# Patient Record
Sex: FEMALE | Race: WHITE | Employment: OTHER | URBAN - METROPOLITAN AREA
[De-identification: names, ages, dates, MRNs, and addresses within clinical notes are randomized per-mention and may not be internally consistent; named-entity substitution may affect disease eponyms.]

---

## 2017-11-29 PROCEDURE — 82550 ASSAY OF CK (CPK): CPT | Performed by: INTERNAL MEDICINE

## 2017-11-29 PROCEDURE — 84439 ASSAY OF FREE THYROXINE: CPT | Performed by: INTERNAL MEDICINE

## 2017-11-29 PROCEDURE — 82306 VITAMIN D 25 HYDROXY: CPT | Performed by: INTERNAL MEDICINE

## 2017-11-29 PROCEDURE — 36415 COLL VENOUS BLD VENIPUNCTURE: CPT | Performed by: INTERNAL MEDICINE

## 2017-11-29 PROCEDURE — 80061 LIPID PANEL: CPT | Performed by: INTERNAL MEDICINE

## 2017-11-29 PROCEDURE — 80050 GENERAL HEALTH PANEL: CPT | Performed by: INTERNAL MEDICINE

## 2017-11-29 PROCEDURE — 81001 URINALYSIS AUTO W/SCOPE: CPT | Performed by: INTERNAL MEDICINE

## 2017-11-29 PROCEDURE — 83036 HEMOGLOBIN GLYCOSYLATED A1C: CPT | Performed by: INTERNAL MEDICINE

## 2018-06-22 PROBLEM — G47.33 OSA (OBSTRUCTIVE SLEEP APNEA): Status: ACTIVE | Noted: 2018-06-22

## 2019-03-31 PROBLEM — H01.001 BLEPHARITIS OF UPPER EYELIDS OF BOTH EYES: Status: ACTIVE | Noted: 2018-11-20

## 2019-03-31 PROBLEM — H25.13 NUCLEAR SCLEROSIS, BILATERAL: Status: ACTIVE | Noted: 2018-11-20

## 2019-03-31 PROBLEM — H01.004 BLEPHARITIS OF UPPER EYELIDS OF BOTH EYES: Status: ACTIVE | Noted: 2018-11-20

## 2019-03-31 PROBLEM — H43.813 DEGENERATION OF POSTERIOR VITREOUS BODY OF BOTH EYES: Status: ACTIVE | Noted: 2018-11-20

## 2019-03-31 PROBLEM — H52.4 PRESBYOPIA: Status: ACTIVE | Noted: 2018-11-20

## 2019-06-12 ENCOUNTER — APPOINTMENT (OUTPATIENT)
Dept: GENERAL RADIOLOGY | Age: 64
End: 2019-06-12
Attending: FAMILY MEDICINE
Payer: MEDICARE

## 2019-06-12 ENCOUNTER — HOSPITAL ENCOUNTER (OUTPATIENT)
Age: 64
Discharge: HOME | End: 2019-06-12
Attending: FAMILY MEDICINE
Payer: MEDICARE

## 2019-06-12 VITALS
RESPIRATION RATE: 18 BRPM | HEART RATE: 72 BPM | DIASTOLIC BLOOD PRESSURE: 56 MMHG | WEIGHT: 170 LBS | HEIGHT: 63 IN | BODY MASS INDEX: 30.12 KG/M2 | SYSTOLIC BLOOD PRESSURE: 109 MMHG | OXYGEN SATURATION: 97 % | TEMPERATURE: 98 F

## 2019-06-12 DIAGNOSIS — S63.635A SPRAIN OF INTERPHALANGEAL JOINT OF LEFT RING FINGER, INITIAL ENCOUNTER: Primary | ICD-10-CM

## 2019-06-12 PROCEDURE — 73140 X-RAY EXAM OF FINGER(S): CPT | Performed by: FAMILY MEDICINE

## 2019-06-12 PROCEDURE — 99203 OFFICE O/P NEW LOW 30 MIN: CPT

## 2019-06-12 PROCEDURE — 29130 APPL FINGER SPLINT STATIC: CPT

## 2019-06-12 NOTE — ED INITIAL ASSESSMENT (HPI)
PATIENT ARRIVED AMBULATORY TO ROOM C/O AN INJURY TO THE 4TH DIGIT ON THE LEFT HAND. PATIENT STATES \"MY DOG WAS RUNNING IN THE HOUSE AND MY FINGER GOT CAUGHT IN THE LEASH\" +SWELLING TO FINGER.

## 2019-07-25 PROBLEM — I51.89 DIASTOLIC DYSFUNCTION: Status: ACTIVE | Noted: 2019-07-25

## 2019-08-31 ENCOUNTER — HOSPITAL ENCOUNTER (OUTPATIENT)
Age: 64
Discharge: HOME | End: 2019-08-31
Attending: EMERGENCY MEDICINE
Payer: MEDICARE

## 2019-08-31 VITALS
HEART RATE: 68 BPM | RESPIRATION RATE: 18 BRPM | WEIGHT: 175 LBS | TEMPERATURE: 98 F | BODY MASS INDEX: 31 KG/M2 | SYSTOLIC BLOOD PRESSURE: 126 MMHG | OXYGEN SATURATION: 97 % | DIASTOLIC BLOOD PRESSURE: 71 MMHG

## 2019-08-31 DIAGNOSIS — S16.1XXA STRAIN OF NECK MUSCLE, INITIAL ENCOUNTER: Primary | ICD-10-CM

## 2019-08-31 DIAGNOSIS — M54.12 CERVICAL RADICULOPATHY: ICD-10-CM

## 2019-08-31 PROCEDURE — 99213 OFFICE O/P EST LOW 20 MIN: CPT

## 2019-08-31 PROCEDURE — 99214 OFFICE O/P EST MOD 30 MIN: CPT

## 2019-08-31 RX ORDER — METHYLPREDNISOLONE 4 MG/1
TABLET ORAL
Qty: 1 PACKAGE | Refills: 0 | Status: SHIPPED | OUTPATIENT
Start: 2019-08-31 | End: 2019-11-18 | Stop reason: ALTCHOICE

## 2019-08-31 NOTE — ED INITIAL ASSESSMENT (HPI)
PATIENT ARRIVED AMBULATORY TO ROOM. PATIENT STATES SHE WAS KAYAKING  ON ROUGH BURKS RECENTLY AND C/O RIGHT ARM PAIN. PATIENT ORIGINATES IN THE RIGHT SIDE OF THE NECK AND RADIATING DOWN THE RIGHT ARM.  PATIENT STATES \"MY HAND IS ON AND OFF NUMB\"

## 2019-08-31 NOTE — ED PROVIDER NOTES
Patient Seen in: [facility]    History   Patient presents with:  Arm Pain    Stated Complaint: arm pain     HPI  Patient states 2 days ago she was kayaking for 3-1/2 hours on [place].   After that she had pain in the Years: 40.00        Pack years: 36        Types: Cigarettes        Quit date: 2013        Years since quittin.6      Smokeless tobacco: Never Used      Tobacco comment: 07   pt smoking again a few months    Alcohol use: No      Alcohol/ Right hand: She exhibits normal range of motion and normal capillary refill. Normal sensation noted. Normal strength noted. Lymphadenopathy:     She has no cervical adenopathy. Neurological: She is alert and oriented to person, place, and time.  Sh methylPREDNISolone (MEDROL) 4 MG Oral Tablet Therapy Pack  Dosepack: take as directed, Normal, Disp-1 Package, R-0

## 2019-11-22 PROBLEM — M48.02 FORAMINAL STENOSIS OF CERVICAL REGION: Status: ACTIVE | Noted: 2019-11-22

## 2019-11-22 PROBLEM — M54.12 BRACHIAL (CERVICAL) NEURITIS: Status: ACTIVE | Noted: 2019-11-22

## 2020-08-11 PROBLEM — M46.92 CERVICAL SPONDYLITIS WITH RADICULITIS (HCC): Status: ACTIVE | Noted: 2020-08-11

## 2020-08-11 PROBLEM — M54.12 CERVICAL SPONDYLITIS WITH RADICULITIS (HCC): Status: ACTIVE | Noted: 2020-08-11

## 2020-08-11 PROBLEM — I50.30 ACC/AHA STAGE B DIASTOLIC HEART FAILURE (HCC): Status: ACTIVE | Noted: 2019-07-25

## 2020-11-26 NOTE — ED PROVIDER NOTES
Patient Seen in: [address] [facility]    History   Patient presents with:  Finger Injury    Stated Complaint: finger pain    HPI    Patient is here with left fourth finger swelling and pain at the proximal IP joint area.   Per nilsa All other systems reviewed and negative except as noted above.     Physical Exam     ED Triage Vitals [06/12/19 1357]   /56   Pulse 72   Resp 18   Temp 98 °F (36.7 °C)   Temp src Oral   SpO2 97 %   O2 Device None (Room air)       Current:/56 pain.  Finger splint applied and justo taped in clinic.   Recommend to keep the splint on for at least 10 days to 2 weeks    Disposition:  Discharge  6/12/2019  2:18 pm    Follow-up:  Bc Solorzano MD  [address] [place] [address] [address] [facility] Patient/Caregiver provided printed discharge information.